# Patient Record
Sex: FEMALE
[De-identification: names, ages, dates, MRNs, and addresses within clinical notes are randomized per-mention and may not be internally consistent; named-entity substitution may affect disease eponyms.]

---

## 2023-12-31 ENCOUNTER — APPOINTMENT (OUTPATIENT)
Dept: AFTER HOURS CARE | Facility: EMERGENCY ROOM | Age: 68
End: 2023-12-31
Payer: COMMERCIAL

## 2023-12-31 ENCOUNTER — APPOINTMENT (OUTPATIENT)
Dept: AFTER HOURS CARE | Facility: EMERGENCY ROOM | Age: 68
End: 2023-12-31

## 2023-12-31 DIAGNOSIS — U07.1 COVID-19: ICD-10-CM

## 2023-12-31 PROBLEM — Z00.00 ENCOUNTER FOR PREVENTIVE HEALTH EXAMINATION: Status: ACTIVE | Noted: 2023-12-31

## 2023-12-31 PROCEDURE — 99203 OFFICE O/P NEW LOW 30 MIN: CPT | Mod: 95

## 2023-12-31 NOTE — HISTORY OF PRESENT ILLNESS
[Home] : at home, [unfilled] , at the time of the visit. [Other Location: e.g. 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

## 2023-12-31 NOTE — PLAN
[FreeTextEntry1] : Med rec performed c patient (already called herself a paxlovid Rx).  Recommended holding her PM xanax 0.25mg, which she states she can do.  If she feels anxious, tremulous, palps, she will take 0.125mg.    Strict ED precautions discussed

## 2023-12-31 NOTE — ASSESSMENT
[FreeTextEntry1] : COVID mild symptoms, moderate/high risk patient based only on age Pt is an ACP, already Rx'd herself paxlovid

## 2023-12-31 NOTE — PHYSICAL EXAM
[de-identified] : PLEASE SEE HPI FOR ADDITIONAL RELEVANT PHYSICAL EXAM FINDINGS GENERAL: no acute distress, nontoxic HEAD: normocephalic EYES: no scleral icterus NECK: trachea midline, Full ROM RESP: no respiratory distress ABD: nondistended MSK: no gross deformity NEURO: alert & fully oriented SKIN: no rash PSYCH: cooperative, good insight, appropriate, fluent speech